# Patient Record
Sex: FEMALE | Race: WHITE | HISPANIC OR LATINO | URBAN - METROPOLITAN AREA
[De-identification: names, ages, dates, MRNs, and addresses within clinical notes are randomized per-mention and may not be internally consistent; named-entity substitution may affect disease eponyms.]

---

## 2018-05-16 ENCOUNTER — OUTPATIENT (OUTPATIENT)
Dept: OUTPATIENT SERVICES | Facility: HOSPITAL | Age: 73
LOS: 1 days | Discharge: HOME | End: 2018-05-16
Payer: MEDICARE

## 2018-05-16 DIAGNOSIS — R94.39 ABNORMAL RESULT OF OTHER CARDIOVASCULAR FUNCTION STUDY: ICD-10-CM

## 2018-05-16 LAB
BUN SERPL-MCNC: 18 MG/DL — SIGNIFICANT CHANGE UP (ref 7–23)
CALCIUM SERPL-MCNC: 9.4 MG/DL — SIGNIFICANT CHANGE UP (ref 8.4–10.5)
CHLORIDE SERPL-SCNC: 101 MMOL/L — SIGNIFICANT CHANGE UP (ref 98–107)
CO2 SERPL-SCNC: 29 MMOL/L — SIGNIFICANT CHANGE UP (ref 22–31)
CREAT SERPL-MCNC: 0.9 MG/DL — SIGNIFICANT CHANGE UP (ref 0.5–1.3)
GLUCOSE SERPL-MCNC: 84 MG/DL — SIGNIFICANT CHANGE UP (ref 70–99)
HBA1C BLD-MCNC: 5.6 % — SIGNIFICANT CHANGE UP (ref 4–5.6)
HCT VFR BLD CALC: 40.6 % — SIGNIFICANT CHANGE UP (ref 34.5–45)
HGB BLD-MCNC: 13.7 G/DL — SIGNIFICANT CHANGE UP (ref 11.5–15.5)
MCHC RBC-ENTMCNC: 31.4 PG — SIGNIFICANT CHANGE UP (ref 27–34)
MCHC RBC-ENTMCNC: 33.7 % — SIGNIFICANT CHANGE UP (ref 32–36)
MCV RBC AUTO: 92.9 FL — SIGNIFICANT CHANGE UP (ref 80–100)
NRBC # FLD: 0 — SIGNIFICANT CHANGE UP
PLATELET # BLD AUTO: 253 K/UL — SIGNIFICANT CHANGE UP (ref 150–400)
PMV BLD: 10.7 FL — SIGNIFICANT CHANGE UP (ref 7–13)
POTASSIUM SERPL-MCNC: 4 MMOL/L — SIGNIFICANT CHANGE UP (ref 3.5–5.3)
POTASSIUM SERPL-SCNC: 4 MMOL/L — SIGNIFICANT CHANGE UP (ref 3.5–5.3)
RBC # BLD: 4.37 M/UL — SIGNIFICANT CHANGE UP (ref 3.8–5.2)
RBC # FLD: 11.5 % — SIGNIFICANT CHANGE UP (ref 10.3–14.5)
SODIUM SERPL-SCNC: 141 MMOL/L — SIGNIFICANT CHANGE UP (ref 135–145)
WBC # BLD: 9.34 K/UL — SIGNIFICANT CHANGE UP (ref 3.8–10.5)
WBC # FLD AUTO: 9.34 K/UL — SIGNIFICANT CHANGE UP (ref 3.8–10.5)

## 2018-05-16 PROCEDURE — 93010 ELECTROCARDIOGRAM REPORT: CPT

## 2018-05-16 RX ORDER — ASPIRIN/CALCIUM CARB/MAGNESIUM 324 MG
1 TABLET ORAL
Qty: 0 | Refills: 0 | COMMUNITY

## 2018-05-16 RX ORDER — BISOPROLOL FUMARATE AND HYDROCHLOROTHIAZIDE 5; 6.25 MG/1; MG/1
1 TABLET ORAL
Qty: 0 | Refills: 0 | COMMUNITY

## 2018-05-16 RX ORDER — LEVOTHYROXINE SODIUM 125 MCG
1 TABLET ORAL
Qty: 0 | Refills: 0 | COMMUNITY

## 2018-05-16 RX ORDER — SODIUM CHLORIDE 9 MG/ML
3 INJECTION INTRAMUSCULAR; INTRAVENOUS; SUBCUTANEOUS EVERY 8 HOURS
Qty: 0 | Refills: 0 | Status: DISCONTINUED | OUTPATIENT
Start: 2018-05-16 | End: 2018-05-31

## 2018-05-16 RX ORDER — ATORVASTATIN CALCIUM 80 MG/1
1 TABLET, FILM COATED ORAL
Qty: 0 | Refills: 0 | COMMUNITY

## 2018-05-16 NOTE — H&P CARDIOLOGY - RS GEN PE MLT RESP DETAILS PC
good air movement/respirations non-labored/breath sounds equal/no chest wall tenderness/airway patent/clear to auscultation bilaterally

## 2018-05-16 NOTE — H&P CARDIOLOGY - HISTORY OF PRESENT ILLNESS
71 y/o F w/ PMH of presents for cardiac catheretization. Pt recently had an abnormal stress test with apical ischemia and EF of.  Pt denies N/V/D, fevers, chills, cough, 73 y/o F w/ PMH of HTN, HLD and Hypothyroid presents for cardiac catheretization. Pt admits to have intermittent chest discomfort and worsening SOB with associated fatigue. Pt recently had an abnormal stress test with apical ischemia and EF of 67%. Pt denies N/V/D, fevers, chills, cough, palpitations, substernal distress, syncope, orthopnea, nocturnal paroxysmal dyspnea, edema, cyanosis,  heart murmurs, varicosities, phlebitis, claudication.

## 2018-05-16 NOTE — H&P CARDIOLOGY - NEGATIVE NEUROLOGICAL SYMPTOMS
no focal seizures/no loss of consciousness/no hemiparesis/no generalized seizures/no tremors/no weakness/no difficulty walking/no loss of sensation/no facial palsy/no transient paralysis/no paresthesias/no syncope/no vertigo/no headache/no confusion

## 2021-08-02 PROBLEM — E03.9 HYPOTHYROIDISM, UNSPECIFIED: Chronic | Status: ACTIVE | Noted: 2018-05-16

## 2021-08-02 PROBLEM — I10 ESSENTIAL (PRIMARY) HYPERTENSION: Chronic | Status: ACTIVE | Noted: 2018-05-16

## 2021-08-02 PROBLEM — E78.5 HYPERLIPIDEMIA, UNSPECIFIED: Chronic | Status: ACTIVE | Noted: 2018-05-16

## 2021-08-10 PROBLEM — Z00.00 ENCOUNTER FOR PREVENTIVE HEALTH EXAMINATION: Status: ACTIVE | Noted: 2021-08-10

## 2021-08-13 ENCOUNTER — APPOINTMENT (OUTPATIENT)
Dept: CARDIOLOGY | Facility: CLINIC | Age: 76
End: 2021-08-13
Payer: MEDICARE

## 2021-08-13 PROCEDURE — ZZZZZ: CPT

## 2021-09-10 ENCOUNTER — NON-APPOINTMENT (OUTPATIENT)
Age: 76
End: 2021-09-10

## 2021-09-10 ENCOUNTER — APPOINTMENT (OUTPATIENT)
Dept: CARDIOLOGY | Facility: CLINIC | Age: 76
End: 2021-09-10
Payer: MEDICARE

## 2021-09-10 VITALS
DIASTOLIC BLOOD PRESSURE: 78 MMHG | SYSTOLIC BLOOD PRESSURE: 146 MMHG | WEIGHT: 163 LBS | OXYGEN SATURATION: 96 % | HEART RATE: 53 BPM

## 2021-09-10 VITALS — DIASTOLIC BLOOD PRESSURE: 78 MMHG | SYSTOLIC BLOOD PRESSURE: 144 MMHG

## 2021-09-10 DIAGNOSIS — Z78.9 OTHER SPECIFIED HEALTH STATUS: ICD-10-CM

## 2021-09-10 DIAGNOSIS — Z82.49 FAMILY HISTORY OF ISCHEMIC HEART DISEASE AND OTHER DISEASES OF THE CIRCULATORY SYSTEM: ICD-10-CM

## 2021-09-10 DIAGNOSIS — R07.9 CHEST PAIN, UNSPECIFIED: ICD-10-CM

## 2021-09-10 PROCEDURE — 93306 TTE W/DOPPLER COMPLETE: CPT

## 2021-09-10 PROCEDURE — 93000 ELECTROCARDIOGRAM COMPLETE: CPT

## 2021-09-10 PROCEDURE — 99205 OFFICE O/P NEW HI 60 MIN: CPT

## 2021-09-10 NOTE — DISCUSSION/SUMMARY
[FreeTextEntry1] : She is a 75-year-old with a history of hypertension, hypercholesterolemia and recently diagnosed mitral regurgitation.  Her chest pain does not appear anginal.  ECG shows sinus bradycardia with nonspecific ST slurring in the lateral precordial leads.\par I have reviewed the echocardiogram and it appears to be most consistent with moderate mitral regurgitation and chronic diastolic dysfunction.\par Her blood pressure control not optimal and I have suggested addition of losartan hydrochlorothiazide 50/12.5 in order to improve her filling pressures as well as afterload reduction.\par She will follow-up with me in 2 weeks time and if her blood pressure control has improved I would proceed with a stress echocardiogram in order to define her to exercise capacity as well as exclude any exercise-induced worsening of her mitral regurgitation either due to ischemic factors or structural lungs.  I will measure her PA pressures both before and after exercise.\par She is planning to see her internist in the near future and will have blood work then forwarded to me.\par This plan was reviewed with her son and daughter.

## 2021-09-10 NOTE — HISTORY OF PRESENT ILLNESS
[FreeTextEntry1] : Dear Dr. Emmanuel,\par Thank you for referring her for cardiovascular valuation.  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  Echocardiography done revealed significant mitral regurgitation and she was referred to me for further assessment and management.\par She describes episodes of chest discomfort that occurs sporadically, not during exercise.  She describes exercising on a stationary bicycle for 25 minutes to an hour 3 times a week without any chest pain.  She does report dyspnea and sweating during exercise.\par She has a history of hypercholesterolemia and hypertension.  She takes her statin medication intermittently.\par She has no history of known coronary artery disease, diabetes mellitus, smoking but does have a family history of heart disease as her brother had a heart attack in his 50s.\par She is accompanied by her daughter who serves as  as well as her son over video conference.

## 2021-09-10 NOTE — CARDIOLOGY SUMMARY
[de-identified] : 9/10/2021: Normal left ventricular systolic function with moderate midsystolic mitral regurgitation.  At least moderate diastolic dysfunction.  Severe left atrial enlargement.

## 2021-09-10 NOTE — PHYSICAL EXAM
[Well Developed] : well developed [Well Nourished] : well nourished [No Acute Distress] : no acute distress [Normal Conjunctiva] : normal conjunctiva [Normal Venous Pressure] : normal venous pressure [No Carotid Bruit] : no carotid bruit [Normal S1, S2] : normal S1, S2 [No Gallop] : no gallop [Clear Lung Fields] : clear lung fields [Good Air Entry] : good air entry [No Respiratory Distress] : no respiratory distress  [Soft] : abdomen soft [Normal Bowel Sounds] : normal bowel sounds [Normal Gait] : normal gait [No Edema] : no edema [No Varicosities] : no varicosities [No Rash] : no rash [Moves all extremities] : moves all extremities [Alert and Oriented] : alert and oriented [Normal memory] : normal memory [de-identified] : 2/6 brief systolic murmur at the left apex.

## 2021-09-24 ENCOUNTER — APPOINTMENT (OUTPATIENT)
Dept: CARDIOLOGY | Facility: CLINIC | Age: 76
End: 2021-09-24
Payer: MEDICARE

## 2021-09-24 VITALS
WEIGHT: 163 LBS | HEIGHT: 64 IN | RESPIRATION RATE: 17 BRPM | SYSTOLIC BLOOD PRESSURE: 124 MMHG | OXYGEN SATURATION: 98 % | DIASTOLIC BLOOD PRESSURE: 78 MMHG | HEART RATE: 54 BPM | BODY MASS INDEX: 27.83 KG/M2

## 2021-09-24 PROCEDURE — 99213 OFFICE O/P EST LOW 20 MIN: CPT

## 2021-09-24 NOTE — DISCUSSION/SUMMARY
[FreeTextEntry1] : She is a 75-year-old with a history of hypertension, hypercholesterolemia and recently diagnosed mitral regurgitation.  Her chest pain does not appear anginal.  ECG shows sinus bradycardia with nonspecific ST slurring in the lateral precordial leads.\par BP now optimized on current regimen.\par Plan for stress echo to exclude both CAD and significant MR.\par  I will measure her PA pressures both before and after exercise.\par  \par This plan was reviewed with her sons.

## 2021-09-24 NOTE — PHYSICAL EXAM
[Well Developed] : well developed [Well Nourished] : well nourished [No Acute Distress] : no acute distress [Normal Conjunctiva] : normal conjunctiva [Normal Venous Pressure] : normal venous pressure [No Carotid Bruit] : no carotid bruit [Normal S1, S2] : normal S1, S2 [No Gallop] : no gallop [Clear Lung Fields] : clear lung fields [Good Air Entry] : good air entry [No Respiratory Distress] : no respiratory distress  [Soft] : abdomen soft [Normal Bowel Sounds] : normal bowel sounds [Normal Gait] : normal gait [No Edema] : no edema [No Varicosities] : no varicosities [No Rash] : no rash [Moves all extremities] : moves all extremities [Alert and Oriented] : alert and oriented [Normal memory] : normal memory [de-identified] : 2/6 brief systolic murmur at the left apex.

## 2021-09-24 NOTE — CARDIOLOGY SUMMARY
[de-identified] : 9/10/2021: Normal left ventricular systolic function with moderate midsystolic mitral regurgitation.  At least moderate diastolic dysfunction.  Severe left atrial enlargement.

## 2021-09-24 NOTE — HISTORY OF PRESENT ILLNESS
[FreeTextEntry1] : She was seen by her PMD.\par Labs are okay.\par She is taking her medications as directed.\par She reports that her chest discomfort has resolved.\par \par \par Prior:\par Thank you for referring her for cardiovascular valuation.  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  Echocardiography done revealed significant mitral regurgitation and she was referred to me for further assessment and management.\par She describes episodes of chest discomfort that occurs sporadically, not during exercise.  She describes exercising on a stationary bicycle for 25 minutes to an hour 3 times a week without any chest pain.  She does report dyspnea and sweating during exercise.\par She has a history of hypercholesterolemia and hypertension.  She takes her statin medication intermittently.\par She has no history of known coronary artery disease, diabetes mellitus, smoking but does have a family history of heart disease as her brother had a heart attack in his 50s.\par She is accompanied by her daughter who serves as  as well as her son over video conference.

## 2021-10-06 ENCOUNTER — APPOINTMENT (OUTPATIENT)
Dept: CARDIOLOGY | Facility: CLINIC | Age: 76
End: 2021-10-06
Payer: MEDICARE

## 2021-10-06 PROCEDURE — 93351 STRESS TTE COMPLETE: CPT

## 2021-12-24 ENCOUNTER — OUTPATIENT (OUTPATIENT)
Dept: OUTPATIENT SERVICES | Facility: HOSPITAL | Age: 76
LOS: 1 days | End: 2021-12-24
Payer: MEDICARE

## 2021-12-24 ENCOUNTER — APPOINTMENT (OUTPATIENT)
Dept: MAMMOGRAPHY | Facility: CLINIC | Age: 76
End: 2021-12-24
Payer: MEDICARE

## 2021-12-24 ENCOUNTER — APPOINTMENT (OUTPATIENT)
Dept: ULTRASOUND IMAGING | Facility: CLINIC | Age: 76
End: 2021-12-24
Payer: MEDICARE

## 2021-12-24 DIAGNOSIS — Z00.8 ENCOUNTER FOR OTHER GENERAL EXAMINATION: ICD-10-CM

## 2021-12-24 PROCEDURE — 76642 ULTRASOUND BREAST LIMITED: CPT | Mod: 26,RT

## 2021-12-24 PROCEDURE — 76642 ULTRASOUND BREAST LIMITED: CPT

## 2022-01-03 ENCOUNTER — APPOINTMENT (OUTPATIENT)
Dept: CARDIOLOGY | Facility: CLINIC | Age: 77
End: 2022-01-03
Payer: MEDICARE

## 2022-01-03 ENCOUNTER — NON-APPOINTMENT (OUTPATIENT)
Age: 77
End: 2022-01-03

## 2022-01-03 VITALS
HEART RATE: 59 BPM | WEIGHT: 164 LBS | DIASTOLIC BLOOD PRESSURE: 66 MMHG | OXYGEN SATURATION: 97 % | SYSTOLIC BLOOD PRESSURE: 144 MMHG | BODY MASS INDEX: 28.15 KG/M2

## 2022-01-03 PROCEDURE — 99214 OFFICE O/P EST MOD 30 MIN: CPT

## 2022-01-03 PROCEDURE — 93000 ELECTROCARDIOGRAM COMPLETE: CPT

## 2022-01-03 RX ORDER — LEVOTHYROXINE SODIUM 0.09 MG/1
88 TABLET ORAL
Qty: 90 | Refills: 2 | Status: ACTIVE | COMMUNITY
Start: 1900-01-01 | End: 1900-01-01

## 2022-01-03 NOTE — DISCUSSION/SUMMARY
[FreeTextEntry1] : She is a 76-year-old with a history of hypertension, hypercholesterolemia and recently diagnosed mitral regurgitation.\par Improved HUNTLEY.\par HTN not optimal. Will uptitrate Losartan to 100 mg and continue HCTZ at 12.5 qd.\par Plan to repeat BP at home and in 1 week with me.\par Written instructions given.\par

## 2022-01-03 NOTE — CARDIOLOGY SUMMARY
[de-identified] : 9/10/2021: Normal left ventricular systolic function with moderate midsystolic mitral regurgitation.  At least moderate diastolic dysfunction.  Severe left atrial enlargement.

## 2022-01-03 NOTE — PHYSICAL EXAM
[Well Developed] : well developed [Well Nourished] : well nourished [No Acute Distress] : no acute distress [Normal Conjunctiva] : normal conjunctiva [Normal Venous Pressure] : normal venous pressure [No Carotid Bruit] : no carotid bruit [Normal S1, S2] : normal S1, S2 [No Gallop] : no gallop [Clear Lung Fields] : clear lung fields [Good Air Entry] : good air entry [No Respiratory Distress] : no respiratory distress  [Soft] : abdomen soft [Normal Bowel Sounds] : normal bowel sounds [Normal Gait] : normal gait [No Edema] : no edema [No Varicosities] : no varicosities [No Rash] : no rash [Moves all extremities] : moves all extremities [Alert and Oriented] : alert and oriented [Normal memory] : normal memory [de-identified] : 2/6 brief systolic murmur at the left apex.

## 2022-01-03 NOTE — HISTORY OF PRESENT ILLNESS
[FreeTextEntry1] : October 19 had covid PNA in [place]. Treated with IVF...\par NO oxygen requirement\par vaccinated with sinovax in June and July\par No exertional dyspnea or orthopnea.\par \par \par Prior:\par She was seen by her PMD.\par Labs are okay.\par She is taking her medications as directed.\par She reports that her chest discomfort has resolved.\par \par \par Prior:\par Thank you for referring her for cardiovascular valuation.  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  Echocardiography done revealed significant mitral regurgitation and she was referred to me for further assessment and management.\par She describes episodes of chest discomfort that occurs sporadically, not during exercise.  She describes exercising on a stationary bicycle for 25 minutes to an hour 3 times a week without any chest pain.  She does report dyspnea and sweating during exercise.\par She has a history of hypercholesterolemia and hypertension.  She takes her statin medication intermittently.\par She has no history of known coronary artery disease, diabetes mellitus, smoking but does have a family history of heart disease as her brother had a heart attack in his 50s.\par She is accompanied by her daughter who serves as  as well as her son over video conference.

## 2022-01-07 LAB
ALBUMIN SERPL ELPH-MCNC: 4.4 G/DL
ALP BLD-CCNC: 63 U/L
ALT SERPL-CCNC: 14 U/L
ANION GAP SERPL CALC-SCNC: 14 MMOL/L
AST SERPL-CCNC: 19 U/L
BASOPHILS # BLD AUTO: 0.09 K/UL
BASOPHILS NFR BLD AUTO: 1.2 %
BILIRUB SERPL-MCNC: 0.2 MG/DL
BUN SERPL-MCNC: 28 MG/DL
CALCIUM SERPL-MCNC: 9.8 MG/DL
CHLORIDE SERPL-SCNC: 104 MMOL/L
CHOLEST SERPL-MCNC: 160 MG/DL
CO2 SERPL-SCNC: 24 MMOL/L
CREAT SERPL-MCNC: 0.88 MG/DL
EOSINOPHIL # BLD AUTO: 0.26 K/UL
EOSINOPHIL NFR BLD AUTO: 3.5 %
GLUCOSE SERPL-MCNC: 79 MG/DL
HCT VFR BLD CALC: 39.5 %
HDLC SERPL-MCNC: 60 MG/DL
HGB BLD-MCNC: 12.5 G/DL
IMM GRANULOCYTES NFR BLD AUTO: 0.1 %
LDLC SERPL CALC-MCNC: 82 MG/DL
LYMPHOCYTES # BLD AUTO: 2.86 K/UL
LYMPHOCYTES NFR BLD AUTO: 38.3 %
MAN DIFF?: NORMAL
MCHC RBC-ENTMCNC: 31.2 PG
MCHC RBC-ENTMCNC: 31.6 GM/DL
MCV RBC AUTO: 98.5 FL
MONOCYTES # BLD AUTO: 0.83 K/UL
MONOCYTES NFR BLD AUTO: 11.1 %
NEUTROPHILS # BLD AUTO: 3.42 K/UL
NEUTROPHILS NFR BLD AUTO: 45.8 %
NONHDLC SERPL-MCNC: 100 MG/DL
NT-PROBNP SERPL-MCNC: 159 PG/ML
PLATELET # BLD AUTO: 263 K/UL
POTASSIUM SERPL-SCNC: 5.3 MMOL/L
PROT SERPL-MCNC: 7.3 G/DL
RBC # BLD: 4.01 M/UL
RBC # FLD: 12.8 %
SODIUM SERPL-SCNC: 142 MMOL/L
TRIGL SERPL-MCNC: 94 MG/DL
WBC # FLD AUTO: 7.47 K/UL

## 2022-01-12 ENCOUNTER — APPOINTMENT (OUTPATIENT)
Dept: CARDIOLOGY | Facility: CLINIC | Age: 77
End: 2022-01-12
Payer: MEDICARE

## 2022-01-12 VITALS
OXYGEN SATURATION: 100 % | SYSTOLIC BLOOD PRESSURE: 128 MMHG | HEART RATE: 58 BPM | BODY MASS INDEX: 28.15 KG/M2 | WEIGHT: 164 LBS | DIASTOLIC BLOOD PRESSURE: 68 MMHG

## 2022-01-12 LAB
ANION GAP SERPL CALC-SCNC: 10 MMOL/L
BUN SERPL-MCNC: 25 MG/DL
CALCIUM SERPL-MCNC: 9.2 MG/DL
CHLORIDE SERPL-SCNC: 103 MMOL/L
CO2 SERPL-SCNC: 26 MMOL/L
CREAT SERPL-MCNC: 0.92 MG/DL
GLUCOSE SERPL-MCNC: 68 MG/DL
POTASSIUM SERPL-SCNC: 4.5 MMOL/L
SODIUM SERPL-SCNC: 140 MMOL/L

## 2022-01-12 PROCEDURE — 99213 OFFICE O/P EST LOW 20 MIN: CPT

## 2022-01-12 PROCEDURE — 36415 COLL VENOUS BLD VENIPUNCTURE: CPT

## 2022-01-12 NOTE — CARDIOLOGY SUMMARY
[de-identified] : 9/10/2021: Normal left ventricular systolic function with moderate midsystolic mitral regurgitation.  At least moderate diastolic dysfunction.  Severe left atrial enlargement.

## 2022-01-12 NOTE — PHYSICAL EXAM
[Well Developed] : well developed [Well Nourished] : well nourished [No Acute Distress] : no acute distress [Normal Conjunctiva] : normal conjunctiva [Normal Venous Pressure] : normal venous pressure [No Carotid Bruit] : no carotid bruit [Normal S1, S2] : normal S1, S2 [No Gallop] : no gallop [Clear Lung Fields] : clear lung fields [Good Air Entry] : good air entry [No Respiratory Distress] : no respiratory distress  [Soft] : abdomen soft [Normal Bowel Sounds] : normal bowel sounds [Normal Gait] : normal gait [No Edema] : no edema [No Varicosities] : no varicosities [No Rash] : no rash [Moves all extremities] : moves all extremities [Alert and Oriented] : alert and oriented [Normal memory] : normal memory [de-identified] : 2/6 brief systolic murmur at the left apex.

## 2022-01-12 NOTE — HISTORY OF PRESENT ILLNESS
[FreeTextEntry1] : Feeling well.\par Taking medications as directed.\par Took 1st Pfizer vaccine this week. Will get 2nd in [place].\par \par Prior:\par October 19 had covid PNA in [place]. Treated with IVF...\par NO oxygen requirement\par vaccinated with sinovax in June and July\par No exertional dyspnea or orthopnea.\par \par Prior:\par She was seen by her PMD.\par Labs are okay.\par She is taking her medications as directed.\par She reports that her chest discomfort has resolved.\par \par Prior:\par Thank you for referring her for cardiovascular valuation.  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  Echocardiography done revealed significant mitral regurgitation and she was referred to me for further assessment and management.\par She describes episodes of chest discomfort that occurs sporadically, not during exercise.  She describes exercising on a stationary bicycle for 25 minutes to an hour 3 times a week without any chest pain.  She does report dyspnea and sweating during exercise.\par She has a history of hypercholesterolemia and hypertension.  She takes her statin medication intermittently.\par She has no history of known coronary artery disease, diabetes mellitus, smoking but does have a family history of heart disease as her brother had a heart attack in his 50s.\par She is accompanied by her daughter who serves as  as well as her son over video conference.

## 2022-07-05 ENCOUNTER — APPOINTMENT (OUTPATIENT)
Dept: CARDIOLOGY | Facility: CLINIC | Age: 77
End: 2022-07-05

## 2022-07-05 ENCOUNTER — NON-APPOINTMENT (OUTPATIENT)
Age: 77
End: 2022-07-05

## 2022-07-05 VITALS
HEART RATE: 55 BPM | SYSTOLIC BLOOD PRESSURE: 122 MMHG | HEIGHT: 64 IN | DIASTOLIC BLOOD PRESSURE: 58 MMHG | OXYGEN SATURATION: 98 %

## 2022-07-05 DIAGNOSIS — M17.11 UNILATERAL PRIMARY OSTEOARTHRITIS, RIGHT KNEE: ICD-10-CM

## 2022-07-05 PROCEDURE — 93000 ELECTROCARDIOGRAM COMPLETE: CPT

## 2022-07-05 PROCEDURE — 99214 OFFICE O/P EST MOD 30 MIN: CPT

## 2022-07-05 NOTE — DISCUSSION/SUMMARY
[FreeTextEntry1] : She is a 76-year-old with a history of hypertension, hypercholesterolemia and recently diagnosed moderate mitral regurgitation who now wants to undergo knee replacement.\par ECG shows sinus rhythm with PRWP that is unchanged from prior.\par Echo: 9/10/2021: Moderate Mitral regurgitation with normal EF - 72%. \par BP is well controlled on his current regimen. Continue Losartan/HCTZ  100/12.5 qd. Hold Losartan the morning of surgery. Continue bisoprolol.\par She is optimized from a cardiovascular standpoint may proceed with the planned surgery.\par

## 2022-07-05 NOTE — CARDIOLOGY SUMMARY
[de-identified] : 9/10/2021: Normal left ventricular systolic function with moderate midsystolic mitral regurgitation.  At least moderate diastolic dysfunction.  Severe left atrial enlargement.

## 2022-07-05 NOTE — HISTORY OF PRESENT ILLNESS
[FreeTextEntry1] : Planning to undergo knee replacement at [facility] for [facility] surgery.\par \par She is a 76-year-old with HTN, hypercholesterolemia and moderate mitral regurgitation who has significant pain in her knees when walking.\par She is able to walk for long distances without chest pain or dyspnea.  Recent exercise stress test (October 2021) showed 6 minutes 23 seconds of exercise comprising 7 METS without any chest pain or acute ECG changes.  Her post exercise echo images showed normal left ventricular systolic function.  Her resting estimated pulmonary artery pressures of 32 mmHg increased to 55 mmHg with exercise consistent with underlying diastolic dysfunction and moderate valvular disease.\par \par She has no history of known coronary artery disease, diabetes mellitus, smoking but does have a family history of heart disease as her brother had a heart attack in his 50s.\par She is accompanied by her daughter who serves as .

## 2022-07-05 NOTE — PHYSICAL EXAM
[Well Developed] : well developed [Well Nourished] : well nourished [No Acute Distress] : no acute distress [Normal Conjunctiva] : normal conjunctiva [Normal Venous Pressure] : normal venous pressure [No Carotid Bruit] : no carotid bruit [Normal S1, S2] : normal S1, S2 [No Gallop] : no gallop [Clear Lung Fields] : clear lung fields [Good Air Entry] : good air entry [No Respiratory Distress] : no respiratory distress  [Soft] : abdomen soft [Normal Bowel Sounds] : normal bowel sounds [Normal Gait] : normal gait [No Edema] : no edema [No Varicosities] : no varicosities [No Rash] : no rash [Moves all extremities] : moves all extremities [Alert and Oriented] : alert and oriented [Normal memory] : normal memory [de-identified] : 2/6 brief systolic murmur at the left apex.

## 2023-03-09 NOTE — DISCUSSION/SUMMARY
[FreeTextEntry1] : She is a 76-year-old with a history of hypertension, hypercholesterolemia and recently diagnosed mitral regurgitation.\par BP much improved on Losartan 100 mg and continue HCTZ at 12.5 qd.\par Home BP logs reviewed.\par Encouraged adequate hydration.\par Labs today.\par \par Follow up in 6 months, or sooner if necessary.\par \par \par

## 2023-05-05 ENCOUNTER — APPOINTMENT (OUTPATIENT)
Dept: CARDIOLOGY | Facility: CLINIC | Age: 78
End: 2023-05-05
Payer: MEDICARE

## 2023-05-05 ENCOUNTER — NON-APPOINTMENT (OUTPATIENT)
Age: 78
End: 2023-05-05

## 2023-05-05 VITALS
WEIGHT: 152 LBS | DIASTOLIC BLOOD PRESSURE: 60 MMHG | RESPIRATION RATE: 17 BRPM | SYSTOLIC BLOOD PRESSURE: 108 MMHG | OXYGEN SATURATION: 99 % | BODY MASS INDEX: 25.95 KG/M2 | HEART RATE: 52 BPM | HEIGHT: 64 IN

## 2023-05-05 PROCEDURE — 93000 ELECTROCARDIOGRAM COMPLETE: CPT

## 2023-05-05 PROCEDURE — 99214 OFFICE O/P EST MOD 30 MIN: CPT

## 2023-05-05 PROCEDURE — 93306 TTE W/DOPPLER COMPLETE: CPT

## 2023-05-05 RX ORDER — ATORVASTATIN CALCIUM 20 MG/1
20 TABLET, FILM COATED ORAL
Qty: 90 | Refills: 3 | Status: ACTIVE | COMMUNITY
Start: 1900-01-01 | End: 1900-01-01

## 2023-05-05 RX ORDER — BISOPROLOL FUMARATE 5 MG/1
5 TABLET, FILM COATED ORAL
Qty: 90 | Refills: 3 | Status: ACTIVE | COMMUNITY
Start: 1900-01-01 | End: 1900-01-01

## 2023-05-05 NOTE — CARDIOLOGY SUMMARY
[de-identified] : 9/10/2021: Normal left ventricular systolic function with moderate midsystolic mitral regurgitation.  At least moderate diastolic dysfunction.  Severe left atrial enlargement.

## 2023-05-05 NOTE — HISTORY OF PRESENT ILLNESS
[FreeTextEntry1] : Planning to undergo [facility] replacement [facility].\par \par She is a 77-year-old with HTN, hypercholesterolemia and moderate mitral regurgitation who has significant pain in her knees when walking.\par She is able to walk for long distances without chest pain or dyspnea.  Recent exercise stress test (October 2021) showed 6 minutes 23 seconds of exercise comprising 7 METS without any chest pain or acute ECG changes.  Her post exercise echo images showed normal left ventricular systolic function.  Her resting estimated pulmonary artery pressures of 32 mmHg increased to 55 mmHg with exercise consistent with underlying diastolic dysfunction and moderate valvular disease.\par \par Recent left knee surgery was unremarkable. She has been exercising without difficulty on a bicycle and lost about 10 lb since the surgery.\par \par She has no history of known coronary artery disease, diabetes mellitus, smoking but does have a family history of heart disease as her brother had a heart attack in his 50s.\par She is accompanied by her daughter who serves as .

## 2023-05-05 NOTE — PHYSICAL EXAM
[Well Developed] : well developed [Well Nourished] : well nourished [No Acute Distress] : no acute distress [Normal Conjunctiva] : normal conjunctiva [Normal Venous Pressure] : normal venous pressure [No Carotid Bruit] : no carotid bruit [Normal S1, S2] : normal S1, S2 [No Gallop] : no gallop [Clear Lung Fields] : clear lung fields [Good Air Entry] : good air entry [No Respiratory Distress] : no respiratory distress  [Soft] : abdomen soft [Normal Bowel Sounds] : normal bowel sounds [Normal Gait] : normal gait [No Edema] : no edema [No Varicosities] : no varicosities [No Rash] : no rash [Moves all extremities] : moves all extremities [Alert and Oriented] : alert and oriented [Normal memory] : normal memory [de-identified] : 2/6 brief systolic murmur at the left apex.

## 2023-05-05 NOTE — DISCUSSION/SUMMARY
[FreeTextEntry1] : She is a 77-year-old with a history of hypertension, hypercholesterolemia and recently diagnosed moderate mitral regurgitation who now wants to undergo knee replacement. Recent left knee replacement was uneventful.\par ECG shows sinus rhythm with PRWP that is unchanged from prior.\par Echo: 9/10/2021: Moderate Mitral regurgitation with normal EF - 72%. Will repeat echo to monitor MR extent.\par BP is well controlled, maybe a bit low, on her current regimen. Will reduce Losartan/HCTZ to 50/12.5 qd. Hold Losartan the morning of surgery. Continue bisoprolol.\par She is optimized from a cardiovascular standpoint may proceed with the planned surgery.\par

## 2024-05-14 ENCOUNTER — NON-APPOINTMENT (OUTPATIENT)
Age: 79
End: 2024-05-14

## 2024-05-14 ENCOUNTER — APPOINTMENT (OUTPATIENT)
Dept: CARDIOLOGY | Facility: CLINIC | Age: 79
End: 2024-05-14
Payer: MEDICARE

## 2024-05-14 VITALS
DIASTOLIC BLOOD PRESSURE: 66 MMHG | BODY MASS INDEX: 26.63 KG/M2 | WEIGHT: 156 LBS | SYSTOLIC BLOOD PRESSURE: 118 MMHG | OXYGEN SATURATION: 97 % | HEIGHT: 64 IN | HEART RATE: 54 BPM

## 2024-05-14 DIAGNOSIS — I10 ESSENTIAL (PRIMARY) HYPERTENSION: ICD-10-CM

## 2024-05-14 DIAGNOSIS — E78.5 HYPERLIPIDEMIA, UNSPECIFIED: ICD-10-CM

## 2024-05-14 DIAGNOSIS — I34.0 NONRHEUMATIC MITRAL (VALVE) INSUFFICIENCY: ICD-10-CM

## 2024-05-14 PROCEDURE — 93000 ELECTROCARDIOGRAM COMPLETE: CPT

## 2024-05-14 PROCEDURE — 99214 OFFICE O/P EST MOD 30 MIN: CPT

## 2024-05-14 NOTE — PHYSICAL EXAM
[Well Developed] : well developed [Well Nourished] : well nourished [No Acute Distress] : no acute distress [Normal Conjunctiva] : normal conjunctiva [Normal Venous Pressure] : normal venous pressure [No Carotid Bruit] : no carotid bruit [Normal S1, S2] : normal S1, S2 [No Gallop] : no gallop [Clear Lung Fields] : clear lung fields [Good Air Entry] : good air entry [No Respiratory Distress] : no respiratory distress  [Soft] : abdomen soft [Normal Bowel Sounds] : normal bowel sounds [Normal Gait] : normal gait [No Edema] : no edema [No Varicosities] : no varicosities [No Rash] : no rash [Moves all extremities] : moves all extremities [Alert and Oriented] : alert and oriented [Normal memory] : normal memory [de-identified] : 2/6 brief systolic murmur at the left apex.

## 2024-05-14 NOTE — DISCUSSION/SUMMARY
[FreeTextEntry1] : She is a 78-year-old with a history of hypertension, hypercholesterolemia and Asymptomatic moderate mitral regurgitation who is Doing well status post knee replacements. ECG shows sinus rhythm with PRWP that is unchanged from prior. Echo: May 5, 2023, normal left ventricular systolic function with mild to moderate mitral regurgitation and severely enlarged left atrium. BP is well controlled, on her current regimen. Will Continue Losartan/HCTZ to 50/12.5 qd. and bisoprolol. Hyperlipidemia: Continue statin at his current dose. Labs today. It appears that she was given Plavix in the setting of COVID infection.  No sign of indication for Plavix now.  She may discontinue this medication. Semiannual follow-up, or if new symptoms arise. [EKG obtained to assist in diagnosis and management of assessed problem(s)] : EKG obtained to assist in diagnosis and management of assessed problem(s)

## 2024-05-14 NOTE — HISTORY OF PRESENT ILLNESS
[FreeTextEntry1] : During COVID in [place] she was given Plavix for "thick blood" Now feels better. Did have one episode of word finding issues, transient. Told to go to the doctor and was ultimately diagnosed with COVID. Goes to the gym. Walks and lifts weights without difficulty. No chest pain or dyspnea. Trnasinet chest Wall pain resolved after 2 days and was not associated with exercise. Feels well since her knee replacements. She is accompanied by her daughter who Assist with translation.  Prior: Planning to undergo knee replacement at [facility] for [facility] surgery.  She is a 77-year-old with HTN, hypercholesterolemia and moderate mitral regurgitation who has significant pain in her knees when walking. She is able to walk for long distances without chest pain or dyspnea.  Recent exercise stress test (October 2021) showed 6 minutes 23 seconds of exercise comprising 7 METS without any chest pain or acute ECG changes.  Her post exercise echo images showed normal left ventricular systolic function.  Her resting estimated pulmonary artery pressures of 32 mmHg increased to 55 mmHg with exercise consistent with underlying diastolic dysfunction and moderate valvular disease.  Recent left knee surgery was unremarkable. She has been exercising without difficulty on a bicycle and lost about 10 lb since the surgery.  She has no history of known coronary artery disease, diabetes mellitus, smoking but does have a family history of heart disease as her brother had a heart attack in his 50s. She is accompanied by her daughter who serves as .

## 2024-05-14 NOTE — CARDIOLOGY SUMMARY
[de-identified] : 9/10/2021: Normal left ventricular systolic function with moderate midsystolic mitral regurgitation.  At least moderate diastolic dysfunction.  Severe left atrial enlargement.

## 2024-05-15 LAB
ALBUMIN SERPL ELPH-MCNC: 4.4 G/DL
ALP BLD-CCNC: 59 U/L
ALT SERPL-CCNC: 22 U/L
ANION GAP SERPL CALC-SCNC: 13 MMOL/L
AST SERPL-CCNC: 25 U/L
BILIRUB SERPL-MCNC: 0.3 MG/DL
BUN SERPL-MCNC: 28 MG/DL
CALCIUM SERPL-MCNC: 9.5 MG/DL
CHLORIDE SERPL-SCNC: 105 MMOL/L
CHOLEST SERPL-MCNC: 169 MG/DL
CO2 SERPL-SCNC: 25 MMOL/L
CREAT SERPL-MCNC: 1.14 MG/DL
EGFR: 49 ML/MIN/1.73M2
GLUCOSE SERPL-MCNC: 85 MG/DL
HCT VFR BLD CALC: 40.5 %
HDLC SERPL-MCNC: 53 MG/DL
HGB BLD-MCNC: 13.1 G/DL
LDLC SERPL CALC-MCNC: 81 MG/DL
MCHC RBC-ENTMCNC: 30.9 PG
MCHC RBC-ENTMCNC: 32.3 GM/DL
MCV RBC AUTO: 95.5 FL
NONHDLC SERPL-MCNC: 116 MG/DL
PLATELET # BLD AUTO: 247 K/UL
POTASSIUM SERPL-SCNC: 5.2 MMOL/L
PROT SERPL-MCNC: 7.6 G/DL
RBC # BLD: 4.24 M/UL
RBC # FLD: 12.6 %
SODIUM SERPL-SCNC: 142 MMOL/L
TRIGL SERPL-MCNC: 211 MG/DL
WBC # FLD AUTO: 8.37 K/UL

## 2024-05-15 RX ORDER — LOSARTAN POTASSIUM AND HYDROCHLOROTHIAZIDE 12.5; 5 MG/1; MG/1
50-12.5 TABLET ORAL
Qty: 1 | Refills: 3 | Status: DISCONTINUED | COMMUNITY
Start: 2021-09-10 | End: 2024-05-15

## 2024-05-15 RX ORDER — LOSARTAN POTASSIUM 50 MG/1
50 TABLET, FILM COATED ORAL DAILY
Qty: 90 | Refills: 2 | Status: ACTIVE | COMMUNITY
Start: 2024-05-15 | End: 1900-01-01

## 2024-05-24 ENCOUNTER — APPOINTMENT (OUTPATIENT)
Dept: CARDIOLOGY | Facility: CLINIC | Age: 79
End: 2024-05-24
Payer: MEDICARE

## 2024-05-24 PROCEDURE — 93306 TTE W/DOPPLER COMPLETE: CPT

## 2024-10-28 ENCOUNTER — NON-APPOINTMENT (OUTPATIENT)
Age: 79
End: 2024-10-28

## 2024-10-28 ENCOUNTER — APPOINTMENT (OUTPATIENT)
Dept: CARDIOLOGY | Facility: CLINIC | Age: 79
End: 2024-10-28
Payer: MEDICARE

## 2024-10-28 VITALS
HEART RATE: 65 BPM | SYSTOLIC BLOOD PRESSURE: 127 MMHG | BODY MASS INDEX: 27.31 KG/M2 | HEIGHT: 64 IN | OXYGEN SATURATION: 96 % | WEIGHT: 160 LBS | DIASTOLIC BLOOD PRESSURE: 79 MMHG

## 2024-10-28 PROCEDURE — G2211 COMPLEX E/M VISIT ADD ON: CPT

## 2024-10-28 PROCEDURE — 93000 ELECTROCARDIOGRAM COMPLETE: CPT

## 2024-10-28 PROCEDURE — 99214 OFFICE O/P EST MOD 30 MIN: CPT

## 2024-10-30 ENCOUNTER — NON-APPOINTMENT (OUTPATIENT)
Age: 79
End: 2024-10-30

## 2024-10-30 ENCOUNTER — APPOINTMENT (OUTPATIENT)
Dept: OBGYN | Facility: CLINIC | Age: 79
End: 2024-10-30

## 2024-10-30 VITALS
BODY MASS INDEX: 26.98 KG/M2 | HEART RATE: 84 BPM | WEIGHT: 158 LBS | HEIGHT: 64 IN | SYSTOLIC BLOOD PRESSURE: 136 MMHG | DIASTOLIC BLOOD PRESSURE: 74 MMHG

## 2024-10-30 PROCEDURE — 99459 PELVIC EXAMINATION: CPT

## 2024-10-30 PROCEDURE — 99203 OFFICE O/P NEW LOW 30 MIN: CPT | Mod: 25

## 2024-10-30 PROCEDURE — G0101: CPT

## 2024-10-31 ENCOUNTER — APPOINTMENT (OUTPATIENT)
Dept: UROLOGY | Facility: CLINIC | Age: 79
End: 2024-10-31
Payer: MEDICARE

## 2024-10-31 DIAGNOSIS — R33.9 RETENTION OF URINE, UNSPECIFIED: ICD-10-CM

## 2024-10-31 DIAGNOSIS — R35.1 NOCTURIA: ICD-10-CM

## 2024-10-31 DIAGNOSIS — N39.41 URGE INCONTINENCE: ICD-10-CM

## 2024-10-31 DIAGNOSIS — N32.81 OVERACTIVE BLADDER: ICD-10-CM

## 2024-10-31 DIAGNOSIS — N81.9 FEMALE GENITAL PROLAPSE, UNSPECIFIED: ICD-10-CM

## 2024-10-31 DIAGNOSIS — N95.2 POSTMENOPAUSAL ATROPHIC VAGINITIS: ICD-10-CM

## 2024-10-31 DIAGNOSIS — R32 UNSPECIFIED URINARY INCONTINENCE: ICD-10-CM

## 2024-10-31 LAB
BILIRUB UR QL STRIP: NEGATIVE
CLARITY UR: CLEAR
COLLECTION METHOD: NORMAL
GLUCOSE UR-MCNC: NEGATIVE
HCG UR QL: 0.2 EU/DL
HGB UR QL STRIP.AUTO: NEGATIVE
KETONES UR-MCNC: NEGATIVE
LEUKOCYTE ESTERASE UR QL STRIP: NEGATIVE
NITRITE UR QL STRIP: NEGATIVE
PH UR STRIP: 8.5
PROT UR STRIP-MCNC: ABNORMAL
SP GR UR STRIP: 1.02

## 2024-10-31 PROCEDURE — 99204 OFFICE O/P NEW MOD 45 MIN: CPT

## 2024-10-31 PROCEDURE — G2211 COMPLEX E/M VISIT ADD ON: CPT

## 2024-10-31 PROCEDURE — 51798 US URINE CAPACITY MEASURE: CPT

## 2024-10-31 RX ORDER — ESTRADIOL 0.1 MG/G
0.1 CREAM VAGINAL
Qty: 1 | Refills: 3 | Status: ACTIVE | COMMUNITY
Start: 2024-10-31 | End: 1900-01-01

## 2024-10-31 RX ORDER — MIRABEGRON 25 MG/1
25 TABLET, FILM COATED, EXTENDED RELEASE ORAL
Qty: 30 | Refills: 3 | Status: ACTIVE | COMMUNITY
Start: 2024-10-31 | End: 1900-01-01

## 2024-11-03 PROBLEM — R33.9 INCOMPLETE BLADDER EMPTYING: Status: ACTIVE | Noted: 2024-11-03

## 2024-11-20 ENCOUNTER — NON-APPOINTMENT (OUTPATIENT)
Age: 79
End: 2024-11-20

## 2024-12-28 PROBLEM — Z12.39 BREAST CANCER SCREENING: Status: ACTIVE | Noted: 2024-12-28

## 2024-12-28 PROBLEM — N81.10 FEMALE CYSTOCELE: Status: ACTIVE | Noted: 2024-12-28

## 2024-12-28 PROBLEM — K46.9 ENTEROCELE: Status: ACTIVE | Noted: 2024-12-28

## 2024-12-28 PROBLEM — Z13.31 DEPRESSION SCREENING: Status: ACTIVE | Noted: 2024-12-28

## 2025-01-10 ENCOUNTER — APPOINTMENT (OUTPATIENT)
Dept: UROLOGY | Facility: CLINIC | Age: 80
End: 2025-01-10

## 2025-02-03 ENCOUNTER — APPOINTMENT (OUTPATIENT)
Dept: CARDIOLOGY | Facility: CLINIC | Age: 80
End: 2025-02-03
Payer: MEDICARE

## 2025-02-03 ENCOUNTER — NON-APPOINTMENT (OUTPATIENT)
Age: 80
End: 2025-02-03

## 2025-02-03 VITALS
DIASTOLIC BLOOD PRESSURE: 70 MMHG | SYSTOLIC BLOOD PRESSURE: 110 MMHG | BODY MASS INDEX: 27.49 KG/M2 | HEIGHT: 64 IN | HEART RATE: 72 BPM | WEIGHT: 161 LBS

## 2025-02-03 DIAGNOSIS — I10 ESSENTIAL (PRIMARY) HYPERTENSION: ICD-10-CM

## 2025-02-03 DIAGNOSIS — I34.0 NONRHEUMATIC MITRAL (VALVE) INSUFFICIENCY: ICD-10-CM

## 2025-02-03 DIAGNOSIS — E78.5 HYPERLIPIDEMIA, UNSPECIFIED: ICD-10-CM

## 2025-02-03 PROCEDURE — 93000 ELECTROCARDIOGRAM COMPLETE: CPT

## 2025-02-03 PROCEDURE — 99214 OFFICE O/P EST MOD 30 MIN: CPT

## 2025-02-03 PROCEDURE — G2211 COMPLEX E/M VISIT ADD ON: CPT

## 2025-02-13 ENCOUNTER — APPOINTMENT (OUTPATIENT)
Dept: UROLOGY | Facility: CLINIC | Age: 80
End: 2025-02-13
Payer: MEDICARE

## 2025-02-13 VITALS
WEIGHT: 161 LBS | SYSTOLIC BLOOD PRESSURE: 140 MMHG | HEIGHT: 64 IN | DIASTOLIC BLOOD PRESSURE: 70 MMHG | BODY MASS INDEX: 27.49 KG/M2 | TEMPERATURE: 97.6 F

## 2025-02-13 DIAGNOSIS — D49.59 NEOPLASM UNSPECIFIED BEHAVIOR OF OTHER GENITOURINARY ORGAN: ICD-10-CM

## 2025-02-13 DIAGNOSIS — N81.10 NEOPLASM UNSPECIFIED BEHAVIOR OF OTHER GENITOURINARY ORGAN: ICD-10-CM

## 2025-02-13 DIAGNOSIS — N81.9 FEMALE GENITAL PROLAPSE, UNSPECIFIED: ICD-10-CM

## 2025-02-13 PROCEDURE — 99215 OFFICE O/P EST HI 40 MIN: CPT | Mod: 57

## 2025-02-13 PROCEDURE — 99459 PELVIC EXAMINATION: CPT

## 2025-02-21 ENCOUNTER — OUTPATIENT (OUTPATIENT)
Dept: OUTPATIENT SERVICES | Facility: HOSPITAL | Age: 80
LOS: 1 days | End: 2025-02-21
Payer: MEDICARE

## 2025-02-21 VITALS
OXYGEN SATURATION: 97 % | HEART RATE: 72 BPM | TEMPERATURE: 97 F | SYSTOLIC BLOOD PRESSURE: 122 MMHG | DIASTOLIC BLOOD PRESSURE: 62 MMHG | WEIGHT: 160.06 LBS | HEIGHT: 64 IN | RESPIRATION RATE: 16 BRPM

## 2025-02-21 DIAGNOSIS — Z01.818 ENCOUNTER FOR OTHER PREPROCEDURAL EXAMINATION: ICD-10-CM

## 2025-02-21 DIAGNOSIS — Z98.890 OTHER SPECIFIED POSTPROCEDURAL STATES: Chronic | ICD-10-CM

## 2025-02-21 DIAGNOSIS — N81.9 FEMALE GENITAL PROLAPSE, UNSPECIFIED: ICD-10-CM

## 2025-02-21 DIAGNOSIS — Z96.653 PRESENCE OF ARTIFICIAL KNEE JOINT, BILATERAL: Chronic | ICD-10-CM

## 2025-02-21 PROCEDURE — G0463: CPT

## 2025-02-21 PROCEDURE — 87086 URINE CULTURE/COLONY COUNT: CPT

## 2025-02-21 PROCEDURE — 87077 CULTURE AEROBIC IDENTIFY: CPT

## 2025-02-21 NOTE — H&P PST ADULT - NSANTHOSAYNRD_GEN_A_CORE
No. SHANI screening performed.  STOP BANG Legend: 0-2 = LOW Risk; 3-4 = INTERMEDIATE Risk; 5-8 = HIGH Risk

## 2025-02-21 NOTE — H&P PST ADULT - NSICDXPASTMEDICALHX_GEN_ALL_CORE_FT
PAST MEDICAL HISTORY:  Female genital prolapse     GERD (gastroesophageal reflux disease)     HLD (hyperlipidemia)     HTN (hypertension)     Hypothyroid     Stress incontinence     Urge incontinence

## 2025-02-21 NOTE — H&P PST ADULT - HISTORY OF PRESENT ILLNESS
This is a 79 year old Greek speaking female with PMHx of Hypothyroidism,  HTN, HLD, GERD, Hypothyroidism for PST evaluation today. She  is scheduled for Sacrospinous fixation W/Vnew Graft Enplace devise  with Dr. Vidal on 3/4/2025.Patient reports that she has  urge and stress incontinence. Also she has bladder and Vaginal prolapse.  It's bothersome for the patient. Denies nay dysuria or hematuria.   Denies chest pain , SOB or palpitations at this time.

## 2025-02-21 NOTE — H&P PST ADULT - NSICDXPASTSURGICALHX_GEN_ALL_CORE_FT
PAST SURGICAL HISTORY:  History of bilateral knee replacement     History of hysteroscopy     S/P bilateral breast reduction     S/P colonoscopy     S/P endoscopy

## 2025-02-21 NOTE — H&P PST ADULT - PROBLEM SELECTOR PLAN 2
Labs :CBC,BMP UA and Urine : on chart  Diagnostics: EKG: on chart   Medical clearance with Dr. MOYER  and cardiologfy clearance  per surgeon.( on chart)  Pre op instructions reviewed and given   Patient to take routine AM meds DOS with sips of water  Verbalized understanding

## 2025-02-21 NOTE — H&P PST ADULT - ATTENDING COMMENTS
Risks benefits alternatives explaining. Including bladder / urethral injury, Bleeding infection, pain, burning with urination/ possible urine retention requiring a catheter. All questions answered. Patient agrees to proceed.

## 2025-02-21 NOTE — H&P PST ADULT - ASSESSMENT
This is a 79 year old Maldivian speaking female with PMHx of Hypothyroidism,  HTN, HLD, GERD, Hypothyroidism for PST evaluation today. She  is scheduled for Sacrospinous fixation W/Vnew Graft Enplace devise  with Dr. Vidal on 3/4/2025.Patient reports that she has  urge and stress incontinence. Also she has bladder and Vaginal prolapse.  It's bothersome for the patient. Denies nay dysuria or hematuria.   Denies chest pain , SOB or palpitations at this time.

## 2025-02-21 NOTE — H&P PST ADULT - NSICDXFAMILYHX_GEN_ALL_CORE_FT
FAMILY HISTORY:  Mother  Still living? Yes, Estimated age: Age Unknown  FHx: pancreatic cancer, Age at diagnosis: Age Unknown    Sibling  Still living? Unknown  FH: breast cancer, Age at diagnosis: Age Unknown

## 2025-02-23 LAB
CULTURE RESULTS: SIGNIFICANT CHANGE UP
SPECIMEN SOURCE: SIGNIFICANT CHANGE UP

## 2025-03-03 NOTE — ASU PATIENT PROFILE, ADULT - FALL HARM RISK - UNIVERSAL INTERVENTIONS
Bed in lowest position, wheels locked, appropriate side rails in place/Call bell, personal items and telephone in reach/Instruct patient to call for assistance before getting out of bed or chair/Non-slip footwear when patient is out of bed/[place] to call system/Physically safe environment - no spills, clutter or unnecessary equipment/Purposeful Proactive Rounding/Room/bathroom lighting operational, light cord in reach

## 2025-03-04 ENCOUNTER — TRANSCRIPTION ENCOUNTER (OUTPATIENT)
Age: 80
End: 2025-03-04

## 2025-03-04 ENCOUNTER — OUTPATIENT (OUTPATIENT)
Dept: OUTPATIENT SERVICES | Facility: HOSPITAL | Age: 80
LOS: 1 days | End: 2025-03-04
Payer: MEDICARE

## 2025-03-04 ENCOUNTER — APPOINTMENT (OUTPATIENT)
Dept: UROLOGY | Facility: HOSPITAL | Age: 80
End: 2025-03-04

## 2025-03-04 VITALS
HEART RATE: 60 BPM | TEMPERATURE: 97 F | OXYGEN SATURATION: 97 % | WEIGHT: 160.06 LBS | HEIGHT: 64 IN | SYSTOLIC BLOOD PRESSURE: 137 MMHG | RESPIRATION RATE: 18 BRPM | DIASTOLIC BLOOD PRESSURE: 74 MMHG

## 2025-03-04 VITALS
SYSTOLIC BLOOD PRESSURE: 120 MMHG | HEART RATE: 90 BPM | DIASTOLIC BLOOD PRESSURE: 84 MMHG | RESPIRATION RATE: 14 BRPM | OXYGEN SATURATION: 96 %

## 2025-03-04 DIAGNOSIS — Z98.890 OTHER SPECIFIED POSTPROCEDURAL STATES: Chronic | ICD-10-CM

## 2025-03-04 DIAGNOSIS — N81.9 FEMALE GENITAL PROLAPSE, UNSPECIFIED: ICD-10-CM

## 2025-03-04 DIAGNOSIS — Z96.653 PRESENCE OF ARTIFICIAL KNEE JOINT, BILATERAL: Chronic | ICD-10-CM

## 2025-03-04 PROCEDURE — 57282 COLPOPEXY EXTRAPERITONEAL: CPT

## 2025-03-04 PROCEDURE — C1762: CPT

## 2025-03-04 PROCEDURE — 57285 REPAIR PARAVAG DEFECT VAG: CPT

## 2025-03-04 PROCEDURE — 57267 INSERT MESH/PELVIC FLR ADDON: CPT

## 2025-03-04 PROCEDURE — C1889: CPT

## 2025-03-04 DEVICE — LINA MEDICAL ENPLACE KIT: Type: IMPLANTABLE DEVICE | Status: FUNCTIONAL

## 2025-03-04 RX ORDER — ATORVASTATIN CALCIUM 80 MG/1
1 TABLET, FILM COATED ORAL
Refills: 0 | DISCHARGE

## 2025-03-04 RX ORDER — HYDROMORPHONE/SOD CHLOR,ISO/PF 2 MG/10 ML
0.5 SYRINGE (ML) INJECTION
Refills: 0 | Status: DISCONTINUED | OUTPATIENT
Start: 2025-03-04 | End: 2025-03-04

## 2025-03-04 RX ORDER — CLINDAMYCIN PHOSPHATE 150 MG/ML
600 VIAL (ML) INJECTION ONCE
Refills: 0 | Status: COMPLETED | OUTPATIENT
Start: 2025-03-04 | End: 2025-03-04

## 2025-03-04 RX ORDER — SODIUM CHLORIDE 9 G/1000ML
1000 INJECTION, SOLUTION INTRAVENOUS
Refills: 0 | Status: DISCONTINUED | OUTPATIENT
Start: 2025-03-04 | End: 2025-03-04

## 2025-03-04 RX ORDER — OXYCODONE HYDROCHLORIDE AND ACETAMINOPHEN 10; 325 MG/1; MG/1
1 TABLET ORAL
Qty: 5 | Refills: 0
Start: 2025-03-04

## 2025-03-04 RX ORDER — LEVOTHYROXINE SODIUM 300 MCG
1 TABLET ORAL
Refills: 0 | DISCHARGE

## 2025-03-04 RX ORDER — ONDANSETRON HCL/PF 4 MG/2 ML
4 VIAL (ML) INJECTION ONCE
Refills: 0 | Status: DISCONTINUED | OUTPATIENT
Start: 2025-03-04 | End: 2025-03-04

## 2025-03-04 RX ORDER — LOSARTAN POTASSIUM 100 MG/1
1 TABLET, FILM COATED ORAL
Refills: 0 | DISCHARGE

## 2025-03-04 NOTE — ASU DISCHARGE PLAN (ADULT/PEDIATRIC) - FINANCIAL ASSISTANCE
[facility] provides services at a reduced cost to those who are determined to be eligible through [facility]’s financial assistance program. Information regarding [facility]’s financial assistance program can be found by going to [email or web address] or by calling [phone].

## 2025-03-11 ENCOUNTER — NON-APPOINTMENT (OUTPATIENT)
Age: 80
End: 2025-03-11

## 2025-03-12 PROBLEM — N39.3 STRESS INCONTINENCE (FEMALE) (MALE): Chronic | Status: ACTIVE | Noted: 2025-02-21

## 2025-03-12 PROBLEM — N39.41 URGE INCONTINENCE: Chronic | Status: ACTIVE | Noted: 2025-02-21

## 2025-03-12 PROBLEM — K21.9 GASTRO-ESOPHAGEAL REFLUX DISEASE WITHOUT ESOPHAGITIS: Chronic | Status: ACTIVE | Noted: 2025-02-21

## 2025-03-12 PROBLEM — N81.9 FEMALE GENITAL PROLAPSE, UNSPECIFIED: Chronic | Status: ACTIVE | Noted: 2025-02-21

## 2025-03-17 ENCOUNTER — APPOINTMENT (OUTPATIENT)
Dept: UROLOGY | Facility: CLINIC | Age: 80
End: 2025-03-17
Payer: MEDICARE

## 2025-03-17 ENCOUNTER — APPOINTMENT (OUTPATIENT)
Dept: UROLOGY | Facility: CLINIC | Age: 80
End: 2025-03-17

## 2025-03-17 VITALS
DIASTOLIC BLOOD PRESSURE: 75 MMHG | HEART RATE: 70 BPM | OXYGEN SATURATION: 98 % | TEMPERATURE: 97.5 F | SYSTOLIC BLOOD PRESSURE: 115 MMHG | WEIGHT: 161 LBS | HEIGHT: 64 IN | BODY MASS INDEX: 27.49 KG/M2

## 2025-03-17 DIAGNOSIS — N39.41 URGE INCONTINENCE: ICD-10-CM

## 2025-03-17 DIAGNOSIS — N32.81 OVERACTIVE BLADDER: ICD-10-CM

## 2025-03-17 DIAGNOSIS — N81.9 FEMALE GENITAL PROLAPSE, UNSPECIFIED: ICD-10-CM

## 2025-03-17 LAB
BILIRUB UR QL STRIP: NEGATIVE
CLARITY UR: CLEAR
COLLECTION METHOD: NORMAL
GLUCOSE UR-MCNC: NEGATIVE
HCG UR QL: 0.2 EU/DL
HGB UR QL STRIP.AUTO: NEGATIVE
KETONES UR-MCNC: NEGATIVE
LEUKOCYTE ESTERASE UR QL STRIP: ABNORMAL
NITRITE UR QL STRIP: NEGATIVE
PH UR STRIP: 7
PROT UR STRIP-MCNC: NORMAL
SP GR UR STRIP: 1.02

## 2025-03-17 PROCEDURE — 99459 PELVIC EXAMINATION: CPT

## 2025-03-17 PROCEDURE — 51798 US URINE CAPACITY MEASURE: CPT

## 2025-03-17 PROCEDURE — 99212 OFFICE O/P EST SF 10 MIN: CPT | Mod: 24

## 2025-05-13 ENCOUNTER — APPOINTMENT (OUTPATIENT)
Dept: CARDIOLOGY | Facility: CLINIC | Age: 80
End: 2025-05-13

## 2025-07-30 ENCOUNTER — APPOINTMENT (OUTPATIENT)
Dept: CARDIOLOGY | Facility: CLINIC | Age: 80
End: 2025-07-30
Payer: MEDICARE

## 2025-07-30 VITALS
OXYGEN SATURATION: 94 % | HEART RATE: 71 BPM | BODY MASS INDEX: 28.32 KG/M2 | SYSTOLIC BLOOD PRESSURE: 115 MMHG | DIASTOLIC BLOOD PRESSURE: 60 MMHG | WEIGHT: 165 LBS

## 2025-07-30 DIAGNOSIS — I34.0 NONRHEUMATIC MITRAL (VALVE) INSUFFICIENCY: ICD-10-CM

## 2025-07-30 PROCEDURE — 93000 ELECTROCARDIOGRAM COMPLETE: CPT

## 2025-07-30 PROCEDURE — 99214 OFFICE O/P EST MOD 30 MIN: CPT

## 2025-08-11 ENCOUNTER — APPOINTMENT (OUTPATIENT)
Dept: CARDIOLOGY | Facility: CLINIC | Age: 80
End: 2025-08-11
Payer: MEDICARE

## 2025-08-11 PROCEDURE — 93306 TTE W/DOPPLER COMPLETE: CPT

## 2025-08-26 ENCOUNTER — APPOINTMENT (OUTPATIENT)
Dept: ORTHOPEDIC SURGERY | Facility: CLINIC | Age: 80
End: 2025-08-26

## 2025-08-26 ENCOUNTER — NON-APPOINTMENT (OUTPATIENT)
Age: 80
End: 2025-08-26

## 2025-08-26 VITALS
HEART RATE: 64 BPM | DIASTOLIC BLOOD PRESSURE: 68 MMHG | HEIGHT: 64 IN | WEIGHT: 165 LBS | SYSTOLIC BLOOD PRESSURE: 129 MMHG | BODY MASS INDEX: 28.17 KG/M2

## 2025-08-26 DIAGNOSIS — M70.61 TROCHANTERIC BURSITIS, RIGHT HIP: ICD-10-CM

## 2025-08-26 RX ORDER — IBUPROFEN 600 MG/1
600 TABLET, FILM COATED ORAL 3 TIMES DAILY
Qty: 90 | Refills: 2 | Status: ACTIVE | COMMUNITY
Start: 2025-08-26 | End: 1900-01-01

## 2025-09-02 DIAGNOSIS — N81.10 CYSTOCELE, UNSPECIFIED: ICD-10-CM

## 2025-09-03 ENCOUNTER — APPOINTMENT (OUTPATIENT)
Dept: UROLOGY | Facility: CLINIC | Age: 80
End: 2025-09-03
Payer: MEDICARE

## 2025-09-03 VITALS
WEIGHT: 165 LBS | HEIGHT: 64 IN | BODY MASS INDEX: 28.17 KG/M2 | OXYGEN SATURATION: 98 % | HEART RATE: 63 BPM | DIASTOLIC BLOOD PRESSURE: 70 MMHG | SYSTOLIC BLOOD PRESSURE: 110 MMHG

## 2025-09-03 DIAGNOSIS — N32.81 OVERACTIVE BLADDER: ICD-10-CM

## 2025-09-03 DIAGNOSIS — N39.41 URGE INCONTINENCE: ICD-10-CM

## 2025-09-03 DIAGNOSIS — N81.9 FEMALE GENITAL PROLAPSE, UNSPECIFIED: ICD-10-CM

## 2025-09-03 DIAGNOSIS — R33.9 RETENTION OF URINE, UNSPECIFIED: ICD-10-CM

## 2025-09-03 LAB
BILIRUB UR QL STRIP: NEGATIVE
CLARITY UR: CLEAR
COLLECTION METHOD: NORMAL
GLUCOSE UR-MCNC: NEGATIVE
HCG UR QL: 0.2 EU/DL
HGB UR QL STRIP.AUTO: NEGATIVE
KETONES UR-MCNC: NEGATIVE
LEUKOCYTE ESTERASE UR QL STRIP: ABNORMAL
NITRITE UR QL STRIP: NEGATIVE
PH UR STRIP: 7
PROT UR STRIP-MCNC: NEGATIVE
SP GR UR STRIP: 1.02

## 2025-09-03 PROCEDURE — 51798 US URINE CAPACITY MEASURE: CPT

## 2025-09-03 PROCEDURE — 99215 OFFICE O/P EST HI 40 MIN: CPT

## 2025-09-03 PROCEDURE — 99459 PELVIC EXAMINATION: CPT

## 2025-09-03 PROCEDURE — G2211 COMPLEX E/M VISIT ADD ON: CPT

## (undated) DEVICE — SUT VICRYL 1 36" CT-1 UNDYED

## (undated) DEVICE — SPONGE PEANUT AUTO COUNT

## (undated) DEVICE — DRSG PAD SANITARY OB

## (undated) DEVICE — PLASTIC SOLUTION BOWL 160Z

## (undated) DEVICE — SUCTION YANKAUER BULBOUS TIP W VENT

## (undated) DEVICE — VAGINAL PACKING 2"

## (undated) DEVICE — SYR ASEPTO

## (undated) DEVICE — LONE STAR RETRACTOR RING 32.5CM X 18.3CM DISP

## (undated) DEVICE — FRAZIER SUCTION TIP 8FR

## (undated) DEVICE — DRAPE GYN IRRIGATION POUCH 19 X 23"

## (undated) DEVICE — DRAPE LIGHT HANDLE COVER (GREEN)

## (undated) DEVICE — SUT MONOCRYL 4-0 27" PS-2 UNDYED

## (undated) DEVICE — TUBING SET GRAVITY 2 SPIKE

## (undated) DEVICE — SUT VICRYL PLUS 0 27" CT-2 UNDYED

## (undated) DEVICE — GLV 6.5 PROTEXIS (WHITE)

## (undated) DEVICE — DRAPE TOWEL BLUE 17" X 24"

## (undated) DEVICE — NDL COUNTER FOAM AND MAGNET 20-40

## (undated) DEVICE — VENODYNE/SCD SLEEVE CALF MEDIUM

## (undated) DEVICE — SUT VICRYL 2-0 27" SH UNDYED

## (undated) DEVICE — NDL HYPO REGULAR BEVEL 25G X 1.5" (BLUE)

## (undated) DEVICE — SUT VICRYL 0 27" CT

## (undated) DEVICE — TUBING RANGER FLUID IRRIGATION SET DISP

## (undated) DEVICE — STAPLER SKIN VISI-STAT 35 WIDE

## (undated) DEVICE — ELCTR BOVIE PENCIL BLADE 10FT

## (undated) DEVICE — WARMING BLANKET UPPER ADULT

## (undated) DEVICE — PLV/PSP-ESU FORCEFX F8J7721A: Type: DURABLE MEDICAL EQUIPMENT

## (undated) DEVICE — SUT POLYSORB 4-0 18" P-13 UNDYED

## (undated) DEVICE — SOL IRR BAG NS 0.9% 3000ML

## (undated) DEVICE — DRSG CURITY GAUZE SPONGE 4 X 4" 12-PLY

## (undated) DEVICE — PACK CYSTO

## (undated) DEVICE — TUBING SUCTION NONCONDUCTIVE 6MM X 12FT

## (undated) DEVICE — FOLEY TRAY 16FR 5CC LF UMETER CLOSED

## (undated) DEVICE — LONE STAR ELASTIC STAY HOOK 12MM BLUNT

## (undated) DEVICE — BLADE SCALPEL SAFETYLOCK #15

## (undated) DEVICE — SYR LUER LOK 20CC